# Patient Record
Sex: MALE | Race: WHITE | NOT HISPANIC OR LATINO | Employment: FULL TIME | URBAN - NONMETROPOLITAN AREA
[De-identification: names, ages, dates, MRNs, and addresses within clinical notes are randomized per-mention and may not be internally consistent; named-entity substitution may affect disease eponyms.]

---

## 2023-08-14 ENCOUNTER — OFFICE VISIT (OUTPATIENT)
Dept: URGENT CARE | Facility: CLINIC | Age: 58
End: 2023-08-14
Payer: COMMERCIAL

## 2023-08-14 VITALS
BODY MASS INDEX: 29.35 KG/M2 | RESPIRATION RATE: 18 BRPM | WEIGHT: 205 LBS | SYSTOLIC BLOOD PRESSURE: 146 MMHG | OXYGEN SATURATION: 97 % | HEIGHT: 70 IN | TEMPERATURE: 98.7 F | DIASTOLIC BLOOD PRESSURE: 83 MMHG

## 2023-08-14 DIAGNOSIS — L23.7 POISON IVY DERMATITIS: ICD-10-CM

## 2023-08-14 DIAGNOSIS — H93.11 TINNITUS OF RIGHT EAR: ICD-10-CM

## 2023-08-14 DIAGNOSIS — H65.03 NON-RECURRENT ACUTE SEROUS OTITIS MEDIA OF BOTH EARS: ICD-10-CM

## 2023-08-14 DIAGNOSIS — M94.0 COSTOCHONDRITIS: Primary | ICD-10-CM

## 2023-08-14 PROCEDURE — S9088 SERVICES PROVIDED IN URGENT: HCPCS | Performed by: STUDENT IN AN ORGANIZED HEALTH CARE EDUCATION/TRAINING PROGRAM

## 2023-08-14 PROCEDURE — 99203 OFFICE O/P NEW LOW 30 MIN: CPT | Performed by: STUDENT IN AN ORGANIZED HEALTH CARE EDUCATION/TRAINING PROGRAM

## 2023-08-14 RX ORDER — OMEPRAZOLE 20 MG/1
20 TABLET, DELAYED RELEASE ORAL DAILY
COMMUNITY

## 2023-08-14 RX ORDER — LANOLIN ALCOHOL/MO/W.PET/CERES
1 CREAM (GRAM) TOPICAL DAILY
COMMUNITY

## 2023-08-14 RX ORDER — FLUTICASONE PROPIONATE 50 MCG
1 SPRAY, SUSPENSION (ML) NASAL DAILY
Qty: 16 G | Refills: 0 | Status: SHIPPED | OUTPATIENT
Start: 2023-08-14

## 2023-08-14 RX ORDER — CLOBETASOL PROPIONATE 0.5 MG/G
CREAM TOPICAL 2 TIMES DAILY
Qty: 60 G | Refills: 0 | Status: SHIPPED | OUTPATIENT
Start: 2023-08-14

## 2023-08-14 NOTE — PROGRESS NOTES
[facility]        NAME: Judy Kirkland is a 62 y.o. male  : 1965    MRN: [id number]  DATE: 2023  TIME: 2:21 PM    Assessment and Plan   Costochondritis [M94.0]  1. Costochondritis        2. Poison ivy dermatitis  clobetasol (TEMOVATE) 0.05 % cream      3. Non-recurrent acute serous otitis media of both ears  fluticasone (FLONASE) 50 mcg/act nasal spray      4. Tinnitus of right ear  fluticasone (FLONASE) 50 mcg/act nasal spray        Osteochondritis-worsens with movement, no concern for ACS. Patient is unable to use NSAIDs. Recommended using Tylenol or OTC lidocaine patches for management. Prescribed clobetasol cream for poison ivy dermatitis that patient can use whenever he gets poison ivy dermatitis. Recommended covering skin appropriately to prevent exposure. Tinnitus of right ear with bilateral middle ear effusion. Unclear if effusion is chronic or acute and if it is the etiology of the tinnitus. Will prescribe Flonase to help eustachian tube dysfunction to drain middle ear effusion. This may or may not resolve tinnitus. Recommended patient follow-up with PCP for further management at this is a chronic condition. Patient Instructions       Follow up with PCP in 3-5 days. Proceed to  ER if symptoms worsen. Chief Complaint     Chief Complaint   Patient presents with   • Chest Pain     Right side rib was cutting grass about 3 weeks ago and the pain started    • Rash     Rash to  b/l x a couple of weeks          History of Present Illness       HPI     Patient presents to clinic today for multiple concerns. Reports pain along right lower ribs anteriorly. Start about 3 weeks ago. Worsens especially with movement such as mowing his lawn while pushing the lawn more or picking something up with the right arm. Denies any fever, chills, chest heaviness or tightness, diaphoresis, dyspnea.   Patient reports he is not able to take NSAIDs due to getting hives. Reports getting poison ivy dermatitis on bilateral lower extremities. He gets these often due to being outdoors. Usually treated with oral steroid. Patient presenting with tinnitus of right ear without hearing loss. Reports this is been ongoing for a while and is intermittent. Review of Systems   Review of Systems   Constitutional: Negative for chills and fever. HENT: Positive for tinnitus. Negative for ear pain, hearing loss and sore throat. Eyes: Negative for pain and visual disturbance. Respiratory: Negative for cough and shortness of breath. Cardiovascular: Negative for chest pain and palpitations. Gastrointestinal: Negative for abdominal pain and vomiting. Genitourinary: Negative for dysuria and hematuria. Musculoskeletal: Positive for arthralgias. Negative for back pain. Skin: Positive for rash. Negative for color change. Neurological: Negative for seizures and syncope. All other systems reviewed and are negative.         Current Medications       Current Outpatient Medications:   •  B Complex Vitamins (B COMPLEX 100 PO), Take 1 tablet by mouth daily, Disp: , Rfl:   •  clobetasol (TEMOVATE) 0.05 % cream, Apply topically 2 (two) times a day, Disp: 60 g, Rfl: 0  •  fluticasone (FLONASE) 50 mcg/act nasal spray, 1 spray into each nostril daily, Disp: 16 g, Rfl: 0  •  glucosamine-chondroitin 500-400 MG tablet, Take 1 tablet by mouth daily, Disp: , Rfl:   •  omeprazole (PriLOSEC OTC) 20 MG tablet, Take 20 mg by mouth daily, Disp: , Rfl:   •  TURMERIC-FISH OIL PO, Take 1 capsule by mouth daily, Disp: , Rfl:     Current Allergies     Allergies as of 08/14/2023 - Reviewed 08/14/2023   Allergen Reaction Noted   • Ibuprofen Hives and Rash 03/13/2018            The following portions of the patient's history were reviewed and updated as appropriate: allergies, current medications, past family history, past medical history, past social history, past surgical history and problem list. History reviewed. No pertinent past medical history. History reviewed. No pertinent surgical history. History reviewed. No pertinent family history. Medications have been verified. Objective   /83   Temp 98.7 °F (37.1 °C)   Resp 18   Ht 5' 10" (1.778 m)   Wt 93 kg (205 lb)   SpO2 97%   BMI 29.41 kg/m²        Physical Exam     Physical Exam  Constitutional:       Appearance: Normal appearance. HENT:      Head: Normocephalic and atraumatic. Right Ear: Hearing, ear canal and external ear normal. A middle ear effusion is present. Tympanic membrane is not perforated or erythematous. Left Ear: Hearing, ear canal and external ear normal. A middle ear effusion is present. Tympanic membrane is not perforated or erythematous. Eyes:      General: No scleral icterus. Cardiovascular:      Rate and Rhythm: Normal rate. Pulmonary:      Effort: Pulmonary effort is normal. No respiratory distress. Chest:       Skin:     Findings: Rash present. Rash is vesicular (Sparse vesicular erythematous lesions along bilateral lower extremities in the ankle area, no signs of infection). Neurological:      General: No focal deficit present. Mental Status: He is alert and oriented to person, place, and time.    Psychiatric:         Mood and Affect: Mood normal.         Behavior: Behavior normal.

## 2023-08-14 NOTE — LETTER
August 14, 2023     Patient: Nasir Montoya   YOB: 1965   Date of Visit: 8/14/2023       To Whom it May Concern:    Nasir Montoya was seen in my clinic on 8/14/2023. Please excuse any absence. If you have any questions or concerns, please don't hesitate to call.          Sincerely,          Haley Caraballo, DO        CC: No Recipients

## 2025-03-28 ENCOUNTER — VBI (OUTPATIENT)
Dept: ADMINISTRATIVE | Facility: OTHER | Age: 60
End: 2025-03-28

## 2025-03-28 NOTE — TELEPHONE ENCOUNTER
03/28/25 8:08 AM     Chart reviewed for CRC: Colonoscopy was/were not submitted to the patient's insurance.     Leanne Coyle MA   [facility]